# Patient Record
Sex: MALE | Race: WHITE | Employment: FULL TIME | URBAN - METROPOLITAN AREA
[De-identification: names, ages, dates, MRNs, and addresses within clinical notes are randomized per-mention and may not be internally consistent; named-entity substitution may affect disease eponyms.]

---

## 2018-09-06 ENCOUNTER — HOSPITAL ENCOUNTER (OUTPATIENT)
Age: 33
Discharge: HOME | End: 2018-09-06
Payer: COMMERCIAL

## 2018-09-06 VITALS
TEMPERATURE: 99 F | SYSTOLIC BLOOD PRESSURE: 154 MMHG | OXYGEN SATURATION: 98 % | RESPIRATION RATE: 16 BRPM | HEART RATE: 89 BPM | DIASTOLIC BLOOD PRESSURE: 83 MMHG

## 2018-09-06 DIAGNOSIS — S61.212A LACERATION OF RIGHT MIDDLE FINGER WITHOUT FOREIGN BODY WITHOUT DAMAGE TO NAIL, INITIAL ENCOUNTER: Primary | ICD-10-CM

## 2018-09-06 PROCEDURE — 99202 OFFICE O/P NEW SF 15 MIN: CPT

## 2018-09-06 PROCEDURE — 12001 RPR S/N/AX/GEN/TRNK 2.5CM/<: CPT | Performed by: PHYSICIAN ASSISTANT

## 2018-09-06 PROCEDURE — 99203 OFFICE O/P NEW LOW 30 MIN: CPT | Performed by: PHYSICIAN ASSISTANT

## 2018-09-06 PROCEDURE — 99202 OFFICE O/P NEW SF 15 MIN: CPT | Performed by: PHYSICIAN ASSISTANT

## 2018-09-06 PROCEDURE — 99203 OFFICE O/P NEW LOW 30 MIN: CPT

## 2018-09-07 NOTE — ED PROVIDER NOTES
Patient Seen in: [facility] In [facility]    History   Patient presents with:  Laceration Abrasion (integumentary)    Stated Complaint: FINGER LAC     HPI    Patient is a 53-year-old male.   Patient arrives for evaluation of a laceration to th display    ED Course as of Sep 07 2311  ------------------------------------------------------------      MDM       A sepsis was performed by PCT. Using 1% lidocaine without epinephrine, local injections were performed.   Less than 1 cc was utilized  The w

## 2018-09-07 NOTE — ED INITIAL ASSESSMENT (HPI)
Pt here with c/o laceration to his right middle finger. He states he cut it with metal at work about 1 hour PTA. Pt is up to date with tetanus.

## 2018-09-13 ENCOUNTER — HOSPITAL ENCOUNTER (OUTPATIENT)
Age: 33
Discharge: HOME | End: 2018-09-13
Payer: COMMERCIAL

## 2018-09-13 VITALS
DIASTOLIC BLOOD PRESSURE: 77 MMHG | HEART RATE: 81 BPM | TEMPERATURE: 98 F | SYSTOLIC BLOOD PRESSURE: 128 MMHG | OXYGEN SATURATION: 98 % | RESPIRATION RATE: 15 BRPM

## 2018-09-13 DIAGNOSIS — Z51.89 VISIT FOR WOUND CHECK: Primary | ICD-10-CM

## 2018-09-13 NOTE — ED PROVIDER NOTES
Patient Seen in: [facility]    History   Patient presents with:  Sut Stap Mia (ingtegumentary)    Stated Complaint: Suture removal    HPI  Patient is a 25-year-old gentleman who presents for suture removal.  Patient sust be fully healed. No signs of secondary infection. Neurological: He is alert and oriented to person, place, and time. Skin: Skin is warm and dry. Psychiatric: He has a normal mood and affect.  His behavior is normal.   Nursing note and vitals reviewed

## 2019-04-16 ENCOUNTER — APPOINTMENT (OUTPATIENT)
Dept: GENERAL RADIOLOGY | Age: 34
End: 2019-04-16
Attending: PHYSICIAN ASSISTANT
Payer: COMMERCIAL

## 2019-04-16 ENCOUNTER — HOSPITAL ENCOUNTER (OUTPATIENT)
Age: 34
Discharge: HOME | End: 2019-04-16
Payer: COMMERCIAL

## 2019-04-16 VITALS
RESPIRATION RATE: 18 BRPM | HEART RATE: 119 BPM | OXYGEN SATURATION: 98 % | DIASTOLIC BLOOD PRESSURE: 92 MMHG | SYSTOLIC BLOOD PRESSURE: 159 MMHG

## 2019-04-16 DIAGNOSIS — S60.142A CONTUSION OF LEFT RING FINGER WITH DAMAGE TO NAIL, INITIAL ENCOUNTER: Primary | ICD-10-CM

## 2019-04-16 PROCEDURE — 99214 OFFICE O/P EST MOD 30 MIN: CPT

## 2019-04-16 PROCEDURE — 73140 X-RAY EXAM OF FINGER(S): CPT | Performed by: PHYSICIAN ASSISTANT

## 2019-04-16 PROCEDURE — 99213 OFFICE O/P EST LOW 20 MIN: CPT

## 2019-04-17 NOTE — ED PROVIDER NOTES
Patient Seen in: [facility] In [place]    History   Patient presents with:  Laceration Abrasion (integumentary)    Stated Complaint: Laceration finger,left hand    HPI    CHIEF COMPLAINT: Left ring finger injury    HISTORY OF PRESENT Jessica Quevedo All other systems reviewed and negative except as noted above.     Physical Exam     ED Triage Vitals [04/16/19 1950]   BP (!) 159/92   Pulse 119   Resp 18   Temp    Temp src    SpO2 98 %   O2 Device None (Room air)       Current:BP (!) 159/92   Pulse 119 Patient was screened and evaluated during this visit. I determined, within reasonable clinical confidence and prior to discharge, that an emergency medical condition was not or was no longer present.   There was no indication for further evaluation, treat

## 2019-04-17 NOTE — ED INITIAL ASSESSMENT (HPI)
While at work, left fourth finger got caught in the rotor. Here with laceration of left fourth finger.

## 2019-07-13 ENCOUNTER — OFFICE VISIT (OUTPATIENT)
Dept: OCCUPATIONAL MEDICINE | Age: 34
End: 2019-07-13
Attending: PHYSICIAN ASSISTANT